# Patient Record
Sex: FEMALE | Race: WHITE | NOT HISPANIC OR LATINO | Employment: FULL TIME | URBAN - METROPOLITAN AREA
[De-identification: names, ages, dates, MRNs, and addresses within clinical notes are randomized per-mention and may not be internally consistent; named-entity substitution may affect disease eponyms.]

---

## 2017-02-26 ENCOUNTER — HOSPITAL ENCOUNTER (EMERGENCY)
Facility: HOSPITAL | Age: 27
Discharge: HOME | End: 2017-02-26
Attending: EMERGENCY MEDICINE | Admitting: EMERGENCY MEDICINE

## 2017-02-26 VITALS
TEMPERATURE: 97.6 F | OXYGEN SATURATION: 96 % | HEIGHT: 63 IN | RESPIRATION RATE: 18 BRPM | WEIGHT: 165 LBS | BODY MASS INDEX: 29.23 KG/M2 | HEART RATE: 110 BPM | SYSTOLIC BLOOD PRESSURE: 118 MMHG | DIASTOLIC BLOOD PRESSURE: 78 MMHG

## 2017-02-26 DIAGNOSIS — L73.2 HIDRADENITIS SUPPURATIVA OF RIGHT AXILLA: Primary | ICD-10-CM

## 2017-02-26 PROCEDURE — 99283 EMERGENCY DEPT VISIT LOW MDM: CPT

## 2017-02-26 PROCEDURE — 10060 I&D ABSCESS SIMPLE/SINGLE: CPT | Performed by: EMERGENCY MEDICINE

## 2017-02-26 PROCEDURE — 99282 EMERGENCY DEPT VISIT SF MDM: CPT | Performed by: EMERGENCY MEDICINE

## 2017-02-26 RX ORDER — HYDROCODONE BITARTRATE AND ACETAMINOPHEN 5; 325 MG/1; MG/1
1 TABLET ORAL ONCE
Status: COMPLETED | OUTPATIENT
Start: 2017-02-26 | End: 2017-02-26

## 2017-02-26 RX ORDER — CLINDAMYCIN HYDROCHLORIDE 150 MG/1
600 CAPSULE ORAL ONCE
Status: COMPLETED | OUTPATIENT
Start: 2017-02-26 | End: 2017-02-26

## 2017-02-26 RX ORDER — CLINDAMYCIN HYDROCHLORIDE 300 MG/1
300 CAPSULE ORAL 3 TIMES DAILY
Qty: 21 CAPSULE | Refills: 0 | Status: SHIPPED | OUTPATIENT
Start: 2017-02-26 | End: 2017-03-05

## 2017-02-26 RX ORDER — LIDOCAINE HYDROCHLORIDE AND EPINEPHRINE 10; 10 MG/ML; UG/ML
20 INJECTION, SOLUTION INFILTRATION; PERINEURAL ONCE
Status: COMPLETED | OUTPATIENT
Start: 2017-02-26 | End: 2017-02-26

## 2017-02-26 RX ORDER — TRAMADOL HYDROCHLORIDE 50 MG/1
50 TABLET ORAL EVERY 8 HOURS PRN
Qty: 15 TABLET | Refills: 0 | Status: SHIPPED | OUTPATIENT
Start: 2017-02-26 | End: 2017-03-03

## 2017-02-26 RX ADMIN — CLINDAMYCIN HYDROCHLORIDE 600 MG: 150 CAPSULE ORAL at 16:25

## 2017-02-26 RX ADMIN — LIDOCAINE HYDROCHLORIDE,EPINEPHRINE BITARTRATE 20 ML: 10; .01 INJECTION, SOLUTION INFILTRATION; PERINEURAL at 16:26

## 2017-02-26 RX ADMIN — HYDROCODONE BITARTRATE AND ACETAMINOPHEN 1 TABLET: 5; 325 TABLET ORAL at 16:25

## 2018-05-11 ENCOUNTER — OFFICE VISIT (OUTPATIENT)
Dept: SURGERY | Facility: CLINIC | Age: 28
End: 2018-05-11

## 2018-05-11 VITALS
HEIGHT: 63 IN | HEART RATE: 72 BPM | SYSTOLIC BLOOD PRESSURE: 120 MMHG | RESPIRATION RATE: 16 BRPM | WEIGHT: 164 LBS | BODY MASS INDEX: 29.06 KG/M2 | DIASTOLIC BLOOD PRESSURE: 80 MMHG

## 2018-05-11 DIAGNOSIS — M79.672 FOOT PAIN, BILATERAL: Primary | ICD-10-CM

## 2018-05-11 DIAGNOSIS — M79.671 FOOT PAIN, BILATERAL: Primary | ICD-10-CM

## 2018-05-11 PROCEDURE — 99202 OFFICE O/P NEW SF 15 MIN: CPT | Performed by: SURGERY

## 2018-08-24 ENCOUNTER — OFFICE VISIT (OUTPATIENT)
Dept: ORTHOPEDIC SURGERY | Facility: CLINIC | Age: 28
End: 2018-08-24

## 2018-08-24 VITALS
HEART RATE: 118 BPM | HEIGHT: 64 IN | BODY MASS INDEX: 27.31 KG/M2 | WEIGHT: 160 LBS | DIASTOLIC BLOOD PRESSURE: 95 MMHG | SYSTOLIC BLOOD PRESSURE: 164 MMHG

## 2018-08-24 DIAGNOSIS — S82.65XA CLOSED NONDISPLACED FRACTURE OF LATERAL MALLEOLUS OF LEFT FIBULA, INITIAL ENCOUNTER: Primary | ICD-10-CM

## 2018-08-24 DIAGNOSIS — S92.355A CLOSED NONDISPLACED FRACTURE OF FIFTH METATARSAL BONE OF LEFT FOOT, INITIAL ENCOUNTER: ICD-10-CM

## 2018-08-24 PROCEDURE — 27786 TREATMENT OF ANKLE FRACTURE: CPT | Performed by: PHYSICIAN ASSISTANT

## 2018-08-24 RX ORDER — ACETAMINOPHEN AND CODEINE PHOSPHATE 300; 30 MG/1; MG/1
TABLET ORAL
COMMUNITY
Start: 2018-08-22

## 2018-08-24 NOTE — PROGRESS NOTES
New Patient Visit        Patient: Sharri Mota  YOB: 1990  Date of encounter: 8/24/2018    Chief Complaint   Patient presents with   • Left Ankle - Pain   • Left Foot - Pain       History of Present Illness:   Sharri Mota is a 28 y.o. female who is referred here today by [place] emergency [facility] for evaluation of acute injury to the left ankle.  She states on August 22, 2018 she fell down approximate 7 steps landing on the left leg.  She is complaining of moderate pain and swelling mainly along the lateral aspect of her ankle and along her pinky toe.  She states it's significantly worse if she tries to bear any weight onto the leg.  She denies paresthesias.    PMH:   There is no problem list on file for this patient.    Past Medical History:   Diagnosis Date   • Dysfunctional uterine bleeding    • Environmental allergies    • Injury of foot, left    • Left ankle injury    • Migraine        PSH:  Past Surgical History:   Procedure Laterality Date   • HYSTERECTOMY     • LAPAROSCOPIC OOPHORECTOMY     • TUBAL ABDOMINAL LIGATION         Allergies:     Allergies   Allergen Reactions   • Penicillins        Medications:     Current Outpatient Prescriptions:   •  acetaminophen-codeine (TYLENOL #3) 300-30 MG per tablet, , Disp: , Rfl:   •  estradiol (ESTRACE) 1 MG tablet, Take 1 mg by mouth Daily., Disp: , Rfl:     Social History:  Social History     Social History   • Marital status: Legally      Spouse name: N/A   • Number of children: N/A   • Years of education: N/A     Occupational History   • Not on file.     Social History Main Topics   • Smoking status: Current Every Day Smoker   • Smokeless tobacco: Never Used   • Alcohol use No   • Drug use: No   • Sexual activity: Not Currently     Other Topics Concern   • Not on file     Social History Narrative   • No narrative on file       Family History:   No family history on file.    Review of Systems:   Review of Systems   Constitutional:  "Positive for activity change.   HENT: Negative.    Eyes: Negative.    Respiratory: Negative.    Cardiovascular: Positive for leg swelling.   Gastrointestinal: Negative.    Endocrine: Negative.    Genitourinary: Negative.    Musculoskeletal: Positive for arthralgias, joint swelling and myalgias.   Skin: Negative.    Neurological: Negative.    Hematological: Bruises/bleeds easily.   Psychiatric/Behavioral: Negative.        Physical Exam: 28 y.o. female  General Appearance:    Alert and oriented x 3, cooperative, in no acute distress                   Vitals:    08/24/18 1432   BP: 164/95   Pulse: 118   Weight: 72.6 kg (160 lb)   Height: 162.6 cm (64\")              Body mass index is 27.46 kg/m².        Musculoskeletal: examination of the left ankle reveals moderate swelling with ecchymosis.  She is significant tenderness along the lateral malleolus as well as the head of the fifth metatarsal.  Her range of motion is not tested secondary to known fracture.  Her neurovascular status is intact.    Radiology:     3 views of the left ankle and left foot were reviewed revealing a nondisplaced fracture through the lateral malleolus as well as the head of the fifth metatarsal    Assessment    ICD-10-CM ICD-9-CM   1. Closed nondisplaced fracture of lateral malleolus of left fibula, initial encounter S82.65XA 824.2   2. Closed nondisplaced fracture of fifth metatarsal bone of left foot, initial encounter S92.355A 825.25       Plan:   a 28-year-old female with acute injury to the left ankle and foot.  I reviewed x-rays today which reveal a nondisplaced fracture through the lateral malleolus as well as the head of the fifth metatarsal.  I'll treat conservatively with immobilization.  Today she was placed in a short leg fiberglass cast.  She was instructed on cast care.  I have advised to continue ambulating with the aid of her crutches and toe-touch weightbearing status.  I also advised relative rest, maximum elevation, ice and " NSAIDs as needed for pain.  She'll return back in 4 weeks for repeat x-rays out of cast.    Carlee Chavez PAC

## 2018-09-06 ENCOUNTER — OFFICE VISIT (OUTPATIENT)
Dept: ORTHOPEDIC SURGERY | Facility: CLINIC | Age: 28
End: 2018-09-06

## 2018-09-06 VITALS
HEART RATE: 96 BPM | DIASTOLIC BLOOD PRESSURE: 93 MMHG | HEIGHT: 64 IN | SYSTOLIC BLOOD PRESSURE: 142 MMHG | BODY MASS INDEX: 29.19 KG/M2 | WEIGHT: 171 LBS

## 2018-09-06 DIAGNOSIS — R52 PAIN: Primary | ICD-10-CM

## 2018-09-06 DIAGNOSIS — S82.65XA CLOSED NONDISPLACED FRACTURE OF LATERAL MALLEOLUS OF LEFT FIBULA, INITIAL ENCOUNTER: ICD-10-CM

## 2018-09-06 DIAGNOSIS — S92.355A CLOSED NONDISPLACED FRACTURE OF FIFTH METATARSAL BONE OF LEFT FOOT, INITIAL ENCOUNTER: ICD-10-CM

## 2018-09-06 PROCEDURE — 73610 X-RAY EXAM OF ANKLE: CPT | Performed by: NURSE PRACTITIONER

## 2018-09-06 PROCEDURE — 99213 OFFICE O/P EST LOW 20 MIN: CPT | Performed by: NURSE PRACTITIONER

## 2018-09-06 PROCEDURE — 73630 X-RAY EXAM OF FOOT: CPT | Performed by: NURSE PRACTITIONER

## 2018-09-06 NOTE — PROGRESS NOTES
Subjective:     Patient ID: Sharri Mota is a 28 y.o. female.    Chief Complaint:  Nondisplaced fracture distal fibula, left 08/10/2018  Nondisplaced fracture fifth metatarsal, left 08/10/2018    History of Present Illness  Sharri Mota is a 28-year-old female who presents with mom and a 3+ week history of pain at the left ankle and left foot.  She was running from significant other when she fell down approximately 15 concrete steps landed in the flower bed, twisted the left ankle and landed on the lateral aspect.  She presented to [place] ED for evaluation was referred to [facility] [place] [facility]. She is originally from [place], [place] and rinse returned him and presents with mom today.  States that pain has significantly improved even within the last weeks and she's been able to rest, elevate.  She is able to ambulate without experiencing significant pain.  Does report swelling and increased pain with long periods of ambulating and standing as well as walking up inclines.  Denies that she is experiencing presence of numbness and tingling over all aspects of the left lower extremity.  Denies prior injury to the ankle and foot and past.  Denies all other concerns that she has at this time.       Social History     Occupational History   • Not on file.     Social History Main Topics   • Smoking status: Current Every Day Smoker   • Smokeless tobacco: Never Used   • Alcohol use No   • Drug use: No   • Sexual activity: Not Currently      Past Medical History:   Diagnosis Date   • Dysfunctional uterine bleeding    • Environmental allergies    • Injury of foot, left    • Left ankle injury    • Migraine      Past Surgical History:   Procedure Laterality Date   • HYSTERECTOMY     • LAPAROSCOPIC OOPHORECTOMY     • TUBAL ABDOMINAL LIGATION         History reviewed. No pertinent family history.      Review of Systems   Constitutional: Negative for chills, diaphoresis, fever and unexpected weight  "change.   HENT: Negative for hearing loss, nosebleeds, sore throat and tinnitus.    Eyes: Negative for pain and visual disturbance.   Respiratory: Negative for cough, shortness of breath and wheezing.    Cardiovascular: Negative for chest pain and palpitations.   Gastrointestinal: Negative for abdominal pain, diarrhea, nausea and vomiting.   Endocrine: Negative for cold intolerance, heat intolerance and polydipsia.   Genitourinary: Negative for difficulty urinating, dysuria and hematuria.   Musculoskeletal: Positive for joint swelling and myalgias. Negative for arthralgias.   Skin: Negative for rash and wound.   Allergic/Immunologic: Negative for environmental allergies.   Neurological: Negative for dizziness, syncope and numbness.   Hematological: Does not bruise/bleed easily.   Psychiatric/Behavioral: Negative for dysphoric mood and sleep disturbance. The patient is not nervous/anxious.            Objective:  Physical Exam    Vital signs reviewed.   General: No acute distress.  Eyes: conjunctiva clear; pupils equally round and reactive  ENT: external ears and nose atraumatic; oropharynx clear  CV: no peripheral edema  Resp: normal respiratory effort  Skin: no rashes or wounds; normal turgor  Psych: mood and affect appropriate; recent and remote memory intact    Vitals:    09/06/18 0941   BP: 142/93   BP Location: Left arm   Pulse: 96   Weight: 77.6 kg (171 lb)   Height: 162.6 cm (64\")     1    09/06/18  0941   Weight: 77.6 kg (171 lb)     Body mass index is 29.35 kg/m².     Left Ankle Exam   Swelling: mild    Tenderness   The patient is experiencing tenderness in the lateral malleolus.     Range of Motion   Dorsiflexion: 15   Plantar flexion: 40     Muscle Strength   Dorsiflexion:  4/5   Plantar flexion:  4/5   Anterior tibial:  4/5   Posterior tibial:  4/5  Gastrocsoleus:  4/5  Peroneal muscle:  4/5    Tests   Anterior drawer: negative  Varus tilt: negative    Other   Erythema: absent  Scars: absent  Sensation: " normal  Pulse: present    Comments:  Negative Penaloza's squeeze test  Negative Homans sign, negative calf tenderness              Imaging:  Left Ankle X-Ray  Indication: Pain  Views: AP, Lateral, Mortise  Findings:  Closed nondisplaced fracture distal fibula  No bony lesion  Soft tissues normal  Normal joint spaces    No prior studies available for comparison.  Left Foot X-Ray  Indication: Pain  AP, Lateral, and Oblique views    Findings:  Closed nondisplaced fracture fifth metatarsal neck fracture and base of fifth metatarsal   No bony lesion  Normal soft tissues  Normal joint spaces    No prior studies were available for comparison.    Assessment:       1. Pain    2. Closed nondisplaced fracture of lateral malleolus of left fibula, initial encounter    3. Closed nondisplaced fracture of fifth metatarsal bone of left foot, initial encounter          Plan:  1.  Discussed plan of care with patient.  Did encourage her to apply a Ace bandage left ankle to reduce swelling along with elevation, rest and ice as needed and she can take ibuprofen and Tylenol as needed for symptom relief.  We'll continue with use of fracture boot she is able to ambulate.  2.  We'll plan to see her back in approximately 3 weeks, repeat x-rays of the left ankle and left foot at that time.  She verbalized understanding of all information agrees plan of care.  Denies all other concerns present at this time.  Orders:  Orders Placed This Encounter   Procedures   • XR foot 3+ vw left   • XR Ankle 3+ View Left       I ordered and reviewed the BEATRICE today.     Dictated utilizing Dragon dictation

## 2018-09-21 ENCOUNTER — APPOINTMENT (OUTPATIENT)
Dept: GENERAL RADIOLOGY | Facility: HOSPITAL | Age: 28
End: 2018-09-21

## 2021-05-13 ENCOUNTER — HOSPITAL ENCOUNTER (OUTPATIENT)
Dept: GENERAL RADIOLOGY | Facility: HOSPITAL | Age: 31
Discharge: HOME | End: 2021-05-13
Admitting: FAMILY MEDICINE

## 2021-05-13 ENCOUNTER — TRANSCRIBE ORDERS (OUTPATIENT)
Dept: ADMINISTRATIVE | Facility: HOSPITAL | Age: 31
End: 2021-05-13

## 2021-05-13 DIAGNOSIS — M54.9 BACK PAIN, UNSPECIFIED BACK LOCATION, UNSPECIFIED BACK PAIN LATERALITY, UNSPECIFIED CHRONICITY: Primary | ICD-10-CM

## 2021-05-13 DIAGNOSIS — M54.9 BACK PAIN, UNSPECIFIED BACK LOCATION, UNSPECIFIED BACK PAIN LATERALITY, UNSPECIFIED CHRONICITY: ICD-10-CM

## 2021-05-13 PROCEDURE — 72100 X-RAY EXAM L-S SPINE 2/3 VWS: CPT

## 2021-05-21 ENCOUNTER — TRANSCRIBE ORDERS (OUTPATIENT)
Dept: ADMINISTRATIVE | Facility: HOSPITAL | Age: 31
End: 2021-05-21

## 2021-05-21 DIAGNOSIS — M51.36 DISC DEGENERATION, LUMBAR: Primary | ICD-10-CM

## 2021-06-15 ENCOUNTER — HOSPITAL ENCOUNTER (OUTPATIENT)
Dept: MRI IMAGING | Facility: HOSPITAL | Age: 31
Discharge: HOME | End: 2021-06-15
Admitting: ORTHOPAEDIC SURGERY

## 2021-06-15 DIAGNOSIS — M51.36 DISC DEGENERATION, LUMBAR: ICD-10-CM

## 2021-06-15 PROCEDURE — 72148 MRI LUMBAR SPINE W/O DYE: CPT

## 2023-01-05 NOTE — PROGRESS NOTES
"Sharri Mota 27 y.o. female presents @ the req of JULIENNE Lujan for eval of 10 painful toenails.  Pt states the pain started in bilat gr toe and gradually spread to all 10 toes.  Pt states all 10 toes are so painful she can barely touch them.        HPI   Above noted.  Sharri wears steal toed boots at work.  She cannot stand to have her toes touched.  She has no fevers or chills.  There is no redness.  There is no drainage.  She has no other complaints.        Review of Systems   All other systems reviewed and are negative.          Past Medical History:   Diagnosis Date   • Dysfunctional uterine bleeding    • Environmental allergies    • Migraine            Past Surgical History:   Procedure Laterality Date   • HYSTERECTOMY     • LAPAROSCOPIC OOPHORECTOMY     • TUBAL ABDOMINAL LIGATION             Physical Exam   Constitutional: She is oriented to person, place, and time. She appears well-developed and well-nourished.   HENT:   Head: Normocephalic and atraumatic.   Musculoskeletal:   Both feet examined.  All of the toenails appear normal.  The feet appear normal as well.   Neurological: She is alert and oriented to person, place, and time.   Skin: Skin is warm and dry.   Psychiatric: She has a normal mood and affect. Her behavior is normal.   Nursing note and vitals reviewed.          /80   Pulse 72   Resp 16   Ht 160 cm (63\")   Wt 74.4 kg (164 lb)   BMI 29.05 kg/m²         Sharri was seen today for ingrown toenail.    Diagnoses and all orders for this visit:    Foot pain, bilateral      Her pain is most likely secondary to her shoes.  I will refer her to Dr. Brown for a second opinion.     Thank you for allowing me to participate in the care of this interesting patient.      " Rituxan Pregnancy And Lactation Text: This medication is Pregnancy Category C and it isn't know if it is safe during pregnancy. It is unknown if this medication is excreted in breast milk but similar antibodies are known to be excreted.

## 2025-03-03 ENCOUNTER — OFFICE VISIT (OUTPATIENT)
Dept: SLEEP MEDICINE | Facility: HOSPITAL | Age: 35
End: 2025-03-03
Payer: COMMERCIAL

## 2025-03-03 VITALS
WEIGHT: 193 LBS | OXYGEN SATURATION: 100 % | SYSTOLIC BLOOD PRESSURE: 125 MMHG | HEART RATE: 78 BPM | BODY MASS INDEX: 35.51 KG/M2 | HEIGHT: 62 IN | DIASTOLIC BLOOD PRESSURE: 81 MMHG

## 2025-03-03 DIAGNOSIS — G47.33 OBSTRUCTIVE SLEEP APNEA, ADULT: Primary | ICD-10-CM

## 2025-03-03 DIAGNOSIS — G47.30 OBSERVED SLEEP APNEA: ICD-10-CM

## 2025-03-03 DIAGNOSIS — G47.10 HYPERSOMNOLENCE: ICD-10-CM

## 2025-03-03 DIAGNOSIS — R06.83 LOUD SNORING: ICD-10-CM

## 2025-03-03 DIAGNOSIS — Z72.0 TOBACCO ABUSE: ICD-10-CM

## 2025-03-03 DIAGNOSIS — E66.9 OBESITY (BMI 30-39.9): ICD-10-CM

## 2025-03-03 PROCEDURE — G0463 HOSPITAL OUTPT CLINIC VISIT: HCPCS

## 2025-03-03 NOTE — PROGRESS NOTES
"[facility] [place] [facility]  [address]  [place] [address]  [phone]    Referring Physician: Carin Dudley  PCP: Carin Dudley APRN    Reason for consult:  Sleep disorders of witnessed apneas    Sharri Tay is a 34 y.o.female was seen in the [facility] today. She is concerned about ALBERTO. Has loud snoring, witnessed apneas and gasping respiration. Ongoing for few years. She sleeps from 10:30pm to 8am. Wakes up feeling somewhat refreshed but gets sleepy within 1 hr of waking up. Gets early morning migraines. Wt gain 50 lbs. She is a current cig smoker.  Paola Sleepiness Score: 10. Caffeine intake 2 per day. Alcohol intake 0 per week.    Sharri Tay  has a past medical history of Dysfunctional uterine bleeding, Environmental allergies, Injury of foot, left, Left ankle injury, and Migraine. HTN, DM2, GERD, depression, anxiety    Current Medications:    Current Outpatient Medications:     acetaminophen-codeine (TYLENOL #3) 300-30 MG per tablet, , Disp: , Rfl:     estradiol (ESTRACE) 1 MG tablet, Take 1 mg by mouth Daily., Disp: , Rfl:    also listed in Sleep Questionnaire.    FH: family history is not on file.  SH:  reports that she has been smoking. She has never used smokeless tobacco. She reports that she does not drink alcohol and does not use drugs.    Pertinent Positive Review of Systems of ear pain, fatigue, swelling ankles, soa, anxiety, depression, frequent heartburn, abdominal bloating, excessive thirst  Rest of Review of Systems was negative as recorded in Sleep Questionnaire.        Vital Signs: /81   Pulse 78   Ht 157.5 cm (62\")   Wt 87.5 kg (193 lb)   SpO2 100%   BMI 35.30 kg/m²     Body mass index is 35.3 kg/m².       Tongue: Large       Dentition: good       Pharynx: Posterior pharyngeal pillars are narrow   Mallampatti: III (soft and hard palate and base of uvula visible)        General: Alert. Cooperative. Well developed. No " acute distress.             Head:  Normocephalic. Symmetrical. Atraumatic.              Eyes: Sclera clear. No icterus. PERRLA. Normal EOM.            Nose: No septal deviation. No drainage.          Throat: No oral lesions. No thrush. Moist mucous membranes.    Chest Wall:  Normal shape. Symmetric expansion with respiration. No tenderness.             Neck:  Trachea midline.           Lungs:  Clear to auscultation bilaterally.            Heart:  Regular rhythm and normal rate. Normal S1 and S2. No murmur.     Abdomen:  Soft, non-tender and non-distended. Normal bowel sounds. No masses.  Extremities:  Moves all extremities well. No edema.           Pulses: Pulses palpable and equal bilaterally.               Skin: Dry. Intact. No bleeding. No rash.           Neuro: Moves all 4 extremities and cranial nerves grossly intact.  Psychiatric: Normal mood and affect.      Report:  No scans are attached to the encounter or orders placed in the encounter.      Impression:  1. Obstructive sleep apnea, adult    2. Observed sleep apnea    3. Loud snoring    4. Hypersomnolence    5. Obesity (BMI 30-39.9)    6. Tobacco abuse          Orders Placed This Encounter   Procedures    Home Sleep Study     Standing Status:   Future     Standing Expiration Date:   3/3/2026     Scheduling Instructions:      Ask patient to sleep on back as much as possible on night of study     Order Specific Question:   May take own meds     Answer:   Yes     Order Specific Question:   Details     Answer:   O2 Implementation per Protocol     Order Specific Question:   Release to patient     Answer:   Routine Release [[id number]]            Plan:  Sharri has typical features of sleep disordered breathing with snoring, apneas, morning headaches, daytime sleepiness.  I will schedule her for a HST to see if significant sleep apnea is confirmed.  If so she will need treatment with a CPAP device.  Discussed basic pathology of sleep apnea and treatment with  CPAP machine.  Cardiovascular health risks were discussed.    Patient is a current smoker and we discussed the importance of quitting.  She states she is trying/planning to quit.    This patient has typical features of obstructive sleep apnea with hypersomnolence snoring and apneas along with elevated BMI and classic oropharyngeal structure.  Likelihood of obstructive sleep apnea is high and a polysomnogram study will therefore be requested.      Possible diagnosis and pathophysiology of obstructive sleep apnea was discussed with the patient.  Health risks of untreated obstructive sleep apnea including cardiovascular risks were discussed.  Patient was cautioned about activities requiring full concentration especially driving at night or for longer distances, and until hypersomnolence is corrected.    Results of sleep testing will be reviewed to see if patient is a candidate for CPAP machine.  Alternatives to therapy include oral mandibular advancing device (OMAD) were discussed. However OMAD is usually only beneficial in mild obstructive sleep apnea.  The benefit of weight loss in reducing severity of obstructive sleep apnea was discussed.  Patient would benefit from adhering to a strict diet to achieve ideal BMI.     Patient will follow up in this clinic after testing.    Thank you for allowing me to participate in your patient's care.    Electronically signed by Hakan Elise MD, 03/03/25, 11:12 AM EST.    Part of this note may be an electronic transcription/translation of spoken language to printed text using the Dragon Dictation System.

## 2025-04-02 ENCOUNTER — HOSPITAL ENCOUNTER (OUTPATIENT)
Dept: SLEEP MEDICINE | Facility: HOSPITAL | Age: 35
Discharge: HOME | End: 2025-04-02
Admitting: INTERNAL MEDICINE
Payer: COMMERCIAL

## 2025-04-02 DIAGNOSIS — G47.33 OBSTRUCTIVE SLEEP APNEA, ADULT: ICD-10-CM

## 2025-04-02 PROCEDURE — G0399 HOME SLEEP TEST/TYPE 3 PORTA: HCPCS

## 2025-04-04 DIAGNOSIS — G47.33 OBSTRUCTIVE SLEEP APNEA, ADULT: Primary | ICD-10-CM

## 2025-04-18 ENCOUNTER — DOCUMENTATION (OUTPATIENT)
Dept: SLEEP MEDICINE | Facility: HOSPITAL | Age: 35
End: 2025-04-18
Payer: COMMERCIAL

## 2025-04-18 NOTE — PROGRESS NOTES
Patient was given her sleep center results over the phone and she verbalized that she understood.  New Patient set up and therapy device/ supply order has been sent over to [facility]

## 2025-07-09 PROBLEM — K21.9 GERD (GASTROESOPHAGEAL REFLUX DISEASE): Status: ACTIVE | Noted: 2025-07-09

## 2025-07-09 PROBLEM — E78.2 MIXED HYPERLIPIDEMIA: Status: ACTIVE | Noted: 2025-07-09

## 2025-07-09 PROBLEM — R07.9 EXERTIONAL CHEST PAIN: Status: ACTIVE | Noted: 2025-07-09

## 2025-07-09 PROBLEM — M25.472 ANKLE EDEMA, BILATERAL: Status: ACTIVE | Noted: 2025-07-09

## 2025-07-09 PROBLEM — F32.A ANXIETY AND DEPRESSION: Status: ACTIVE | Noted: 2025-07-09

## 2025-07-09 PROBLEM — I10 ESSENTIAL HYPERTENSION: Status: ACTIVE | Noted: 2025-07-09

## 2025-07-09 PROBLEM — R06.09 DYSPNEA ON EXERTION: Status: ACTIVE | Noted: 2025-07-09

## 2025-07-09 PROBLEM — G47.33 OSA ON CPAP: Status: ACTIVE | Noted: 2025-07-09

## 2025-07-09 PROBLEM — I82.220 THROMBOSIS OF INFERIOR VENA CAVA: Status: ACTIVE | Noted: 2025-07-09

## 2025-07-09 PROBLEM — L73.2 HIDRADENITIS SUPPURATIVA: Status: ACTIVE | Noted: 2025-07-09

## 2025-07-09 PROBLEM — E11.9 DM2 (DIABETES MELLITUS, TYPE 2): Status: ACTIVE | Noted: 2025-07-09

## 2025-07-09 PROBLEM — M25.471 ANKLE EDEMA, BILATERAL: Status: ACTIVE | Noted: 2025-07-09

## 2025-07-09 PROBLEM — F41.9 ANXIETY AND DEPRESSION: Status: ACTIVE | Noted: 2025-07-09

## 2025-08-14 ENCOUNTER — DOCUMENTATION (OUTPATIENT)
Dept: SLEEP MEDICINE | Facility: HOSPITAL | Age: 35
End: 2025-08-14
Payer: COMMERCIAL

## 2025-08-17 PROBLEM — K59.03 OPIOID-INDUCED CONSTIPATION: Status: ACTIVE | Noted: 2025-08-17

## 2025-08-17 PROBLEM — T40.2X5A OPIOID-INDUCED CONSTIPATION: Status: ACTIVE | Noted: 2025-08-17

## 2025-08-18 ENCOUNTER — OFFICE VISIT (OUTPATIENT)
Dept: GASTROENTEROLOGY | Facility: CLINIC | Age: 35
End: 2025-08-18
Payer: COMMERCIAL

## 2025-08-18 VITALS
SYSTOLIC BLOOD PRESSURE: 102 MMHG | HEIGHT: 62 IN | DIASTOLIC BLOOD PRESSURE: 60 MMHG | BODY MASS INDEX: 35.33 KG/M2 | WEIGHT: 192 LBS

## 2025-08-18 DIAGNOSIS — T40.2X5A OPIOID-INDUCED CONSTIPATION: Primary | ICD-10-CM

## 2025-08-18 DIAGNOSIS — R11.2 NAUSEA AND VOMITING, UNSPECIFIED VOMITING TYPE: ICD-10-CM

## 2025-08-18 DIAGNOSIS — K21.9 GASTROESOPHAGEAL REFLUX DISEASE, UNSPECIFIED WHETHER ESOPHAGITIS PRESENT: ICD-10-CM

## 2025-08-18 DIAGNOSIS — K59.03 OPIOID-INDUCED CONSTIPATION: Primary | ICD-10-CM

## 2025-08-18 DIAGNOSIS — R10.13 EPIGASTRIC ABDOMINAL PAIN: ICD-10-CM

## 2025-08-18 RX ORDER — PREGABALIN 150 MG/1
150 CAPSULE ORAL DAILY
COMMUNITY
Start: 2025-08-16

## 2025-08-18 RX ORDER — PROCHLORPERAZINE MALEATE 10 MG
10 TABLET ORAL EVERY 6 HOURS PRN
Qty: 120 TABLET | Refills: 11 | Status: SHIPPED | OUTPATIENT
Start: 2025-08-18

## 2025-08-18 RX ORDER — CHOLECALCIFEROL (VITAMIN D3) 50 MCG
1 TABLET ORAL DAILY
COMMUNITY
Start: 2025-07-10

## 2025-08-19 ENCOUNTER — TELEPHONE (OUTPATIENT)
Dept: GASTROENTEROLOGY | Facility: CLINIC | Age: 35
End: 2025-08-19
Payer: COMMERCIAL

## 2025-08-21 ENCOUNTER — PATIENT ROUNDING (BHMG ONLY) (OUTPATIENT)
Dept: GASTROENTEROLOGY | Facility: CLINIC | Age: 35
End: 2025-08-21
Payer: COMMERCIAL